# Patient Record
Sex: MALE | Race: OTHER
[De-identification: names, ages, dates, MRNs, and addresses within clinical notes are randomized per-mention and may not be internally consistent; named-entity substitution may affect disease eponyms.]

---

## 2018-07-05 NOTE — EMERGENCY ROOM REPORT
History of Present Illness


General


Chief Complaint:  Edema


Source:  Patient





Present Illness


HPI


** 67 YO Male presents to the ED C/O 3/ 10 in severity intermittent swelling of 

the right side of cheek/ mouth. pt. believes is due to tooth infection however 

denies tooth pain or tenderness. Pt. denies similar symptoms in the past. Pt 

states he doesn't know if he has had dry mouth. denies new medications. Denies 

fevers or chills. denies recent illness. 


Denies CP, Palpitations, LOC, AMS, dizziness, Changes in Vision, Sensation, 

paresthesias, or a sudden severe headache.


Allergies:  


Coded Allergies:  


     No Known Allergies (Unverified , 8/6/15)





Patient History


Past Medical History:  see triage record


Past Surgical History:  none


Pertinent Family History:  none


Immunizations:  UTD


Reviewed Nursing Documentation:  PMH: Agreed; PSxH: Agreed





Nursing Documentation-PMH


Past Medical History:  No History, Except For


Hx Neurological Problems:  Yes - Rheumatoid Arthritis, Fibromyalgia,





Review of Systems


All Other Systems:  negative except mentioned in HPI





Physical Exam





Vital Signs








  Date Time  Temp Pulse Resp B/P (MAP) Pulse Ox O2 Delivery O2 Flow Rate FiO2


 


7/5/18 13:05 97.5 68 16 126/76 94 Room Air  





 97.5       








Sp02 EP Interpretation:  reviewed, normal


General Appearance:  no apparent distress, alert, GCS 15, non-toxic


Head:  normocephalic, atraumatic


ENT:  hearing grossly normal, normal voice, other - mild swelling/fullness 

noted to the right lower cheek area, no palpable fluctuance, increased 

temperature to palpation or notable redness noted.


Neck:  full range of motion


Respiratory:  chest non-tender, lungs clear, normal breath sounds, speaking 

full sentences


Cardiovascular #1:  regular rate, rhythm


Musculoskeletal:  back normal, gait/station normal, normal range of motion, non-

tender


Neurologic:  alert, oriented x3, responsive, motor strength/tone normal, 

sensory intact, speech normal, grossly normal


Psychiatric:  judgement/insight normal


Skin:  normal color, no rash, warm/dry, well hydrated


Lymphatic:  no adenopathy





Medical Decision Making


PA Attestation


Dr. dunham is my supervising Physician whom patient management has been 

discussed with.


Diagnostic Impression:  


 Primary Impression:  


 Sialoadenitis, unspecified


 Additional Impression:  


 Parotid sialolithiasis


ER Course


Pt. presents to the ED c/o  facial swelling that is intermittent. Pt. denies 

tooth pain








Ddx considered but are not limited to sialoadenitis, sialolithiasis, mumps, 

abscess, neoplasm/mass, adenopathy. 





Vital signs: are WNL, pt. is afebrile


H&PE are most consistent with sialoadenitis, due to extension down into the 

neck further imaging is required. 





ORDERS: 








ED INTERVENTIONS: None required at this time. 





DISCHARGE: At this time pt. is stable for d/c to home. Will provide printed 

patient care instructions, and any necessary prescriptions. Care plan and 

follow up instructions have been discussed with the patient prior to discharge.





Last Vital Signs








  Date Time  Temp Pulse Resp B/P (MAP) Pulse Ox O2 Delivery O2 Flow Rate FiO2


 


7/5/18 13:05 97.5 68 16 126/76 94 Room Air  





 97.5       








Disposition:  HOME, SELF-CARE


Condition:  Stable


Scripts


Ibuprofen* (MOTRIN*) 600 Mg Tablet


600 MG ORAL Q6H PRN for For Pain, #20 TAB


   Prov: Alethea Cuevas         7/5/18 


Acetaminophen With Codeine (T#3) (TYLENOL #3 TAB*) Y Tab


1 TAB ORAL Q6HR PRN for For Pain, #9 TAB


   Prov: Alethea Cuevas         7/5/18 


Amoxicillin/Potassium Clav 875-125* (AUGMENTIN 875-125 TABLET*) 1 Each Tablet


1 TAB ORAL TWICE A DAY for 7 Days, #14 TAB


   Prov: Alethea Cuevas         7/5/18


Patient Instructions:  Salivary Gland Infection, Salivary Stone





Additional Instructions:  


Take medications as directed. 


 ** Follow up with a Primary Care Provider and your Dentist in 3-5 days, even 

if your symptoms have resolved. ** 


--Please review list of primary care clinics, if you do not already have a 

primary care provider





Return sooner to ED if new symptoms occur, or current symptoms become worse. 


Do not drink alcohol, drive, or operate heavy machinery while taking Tylenol #  

3 as this may cause drowsiness. 











- Please note that this Emergency Department Report was dictated using [employer] technology software, occasionally this can lead to 

erroneous entry secondary to interpretation by the dictation equipment.











Alethea Cuevas Jul 5, 2018 13:29

## 2018-12-06 ENCOUNTER — HOSPITAL ENCOUNTER (EMERGENCY)
Dept: HOSPITAL 72 - EMR | Age: 68
Discharge: HOME | End: 2018-12-06
Payer: MEDICARE

## 2018-12-06 VITALS — HEIGHT: 67 IN | WEIGHT: 191 LBS | BODY MASS INDEX: 29.98 KG/M2

## 2018-12-06 VITALS — DIASTOLIC BLOOD PRESSURE: 82 MMHG | SYSTOLIC BLOOD PRESSURE: 133 MMHG

## 2018-12-06 DIAGNOSIS — M06.9: ICD-10-CM

## 2018-12-06 DIAGNOSIS — M79.7: ICD-10-CM

## 2018-12-06 DIAGNOSIS — K08.89: Primary | ICD-10-CM

## 2018-12-06 PROCEDURE — 99282 EMERGENCY DEPT VISIT SF MDM: CPT

## 2018-12-06 NOTE — EMERGENCY ROOM REPORT
History of Present Illness


General


Chief Complaint:  Toothache


Source:  Patient, Medical Record





Present Illness


HPI


68-year-old male patient presents the ER complaining of gum and tooth infection 

for the past 2 weeks.  Patient reports history of similar symptoms in the past, 

states he was previously seen at this ER for similar symptoms associated with 

salivary gland infection.  Reports that he did not follow-up with maxillofacial 

surgeon after previous visit.  Reports symptoms began slow in onset during the 

past 2 weeks so he began using Listerine to try and prevent the infection from 

occurring.  Reports saw dentist a month and a half ago, states has not seen 

them since onset of symptoms.  Reports tooth pain on left upper gum.  Denies 

fever, vomiting, chest pain, shortness of breath.  Denies blood or pus drainage 

from gums.


Allergies:  


Coded Allergies:  


     No Known Allergies (Unverified , 12/6/18)





Patient History


Past Medical History:  see triage record


Reviewed Nursing Documentation:  PMH: Agreed; PSxH: Agreed





Nursing Documentation-PMH


Past Medical History:  No History, Except For


Hx Neurological Problems:  Yes - Rheumatoid Arthritis, Fibromyalgia,





Review of Systems


All Other Systems:  negative except mentioned in HPI





Physical Exam





Vital Signs








  Date Time  Temp Pulse Resp B/P (MAP) Pulse Ox O2 Delivery O2 Flow Rate FiO2


 


12/6/18 18:51 98.4 83 16 133/82 94 Room Air  








Sp02 EP Interpretation:  reviewed, normal


General Appearance:  well appearing, no apparent distress, alert, GCS 15, non-

toxic


Head:  normocephalic, atraumatic, other - No facial swelling


Eyes:  bilateral eye normal inspection, bilateral eye PERRL


ENT:  hearing grossly normal, normal pharynx, no angioedema, normal voice, 

uvula midline, moist mucus membranes, other - Left upper gum: Tooth #14 tender 

to palpation, gum inflamed, erythematous with small indurated palpable mass, no 

pus expressible, receding gums; multiple teeth missing


Neck:  full range of motion


Respiratory:  lungs clear, normal breath sounds, no rhonchi, no respiratory 

distress, no accessory muscle use, no wheezing, speaking full sentences


Cardiovascular #1:  regular rate, rhythm, no edema


Neurologic:  alert, oriented x3, responsive, motor strength/tone normal, 

sensory intact


Psychiatric:  mood/affect normal


Skin:  no rash





Medical Decision Making


PA Attestation


Dr. Arevalo  is my supervising Physician whom patient management has been 

discussed with.


Diagnostic Impression:  


 Primary Impression:  


 Toothache


 Additional Impression:  


 Gum inflammation


ER Course


Pt. presents to the ED c/o dental and gum pain.





Ddx considered but are not limited to cellulitis, abscess, dental caries, 

gingivitis, gum infection, preseptal cellulitis.





Does not require imaging at this time.





Vital signs: are WNL, pt. is afebrile





ED INTERVENTIONS: 


Pain medication provided in the ER.


Nontoxic appearing, speaking full sentences, no active draining, mild edema, no 

trismus or vision changes.


Erythematous and inflamed gum, indurated, no fluctuance, left upper gum tooth 

tender to palpation, likely dental infection, does not require I&D at this time.


Follow-up with maxillofacial specialist.  Contact information provided for 

specialist.


Will provide abx for infection to cover for possible infection. provided with 

contact information for dentists.


F/u with PCP and dentist for further treatment.





DISCHARGE:


-Rx provided for Tylenol


-Rx provided for Augmentin





At this time pt. is stable for d/c to home. Patient is resting comfortably, in 

no acute distress, nontoxic appearing, talking and smiling without difficulty.


 Will provide printed patient care instructions and any necessary 

prescriptions. 


Care plan and follow up instructions have been discussed with the patient prior 

to discharge. 


Patient instructed to follow-up with primary care provider in 2 - 3 days.


Followup with dentist.


Patient questions asked and answered.


Patient reports understanding and agreement to treatment plan.


ER precautions given. Patient instructed to return to ER immediately for any 

new or worsening of symptoms including but not limited to fever, worsening of 

pain symptoms, worsening of erythema, red streaking.





- Please note that this Emergency Department Report was dictated using [employer] technology software, occasionally this can lead to 

erroneous entry secondary to interpretation by the dictation equipment.





Last Vital Signs








  Date Time  Temp Pulse Resp B/P (MAP) Pulse Ox O2 Delivery O2 Flow Rate FiO2


 


12/6/18 18:51 98.4 83 16 133/82 94 Room Air  








Disposition:  HOME, SELF-CARE


Condition:  Stable


Scripts


Acetaminophen* (TYLENOL EXTRA STRENGTH*) 500 Mg Tablet


500 MG ORAL Q8H PRN for Prn Headache/Temp > 101, #30 TAB 0 Refills


   Prov: Christopher Sage         12/6/18 


Amoxicillin/Potassium Clav 875-125* (AUGMENTIN 875-125 TABLET*) 1 Each Tablet


1 TAB ORAL TWICE A DAY, #14 TAB


   Prov: Christopher Sage         12/6/18


Patient Instructions:  Dental Pain, Easy-to-Read, Gingivitis, Easy-to-Read





Additional Instructions:  


Follow-up with dentist in 1-2 days.


Followup with primary care provider in 1-2 days. Discuss referral to 

maxillofacial specialist/oral surgeon.


Take medications as directed. 


Patient questions asked and answered.


ER precautions given, patient instructed to return to ER immediately for any 

new or worsening of symptoms.











Christopher Sage Dec 6, 2018 19:30

## 2019-06-19 ENCOUNTER — HOSPITAL ENCOUNTER (EMERGENCY)
Dept: HOSPITAL 72 - EMR | Age: 69
Discharge: HOME | End: 2019-06-19
Payer: MEDICARE

## 2019-06-19 VITALS — SYSTOLIC BLOOD PRESSURE: 134 MMHG | DIASTOLIC BLOOD PRESSURE: 81 MMHG

## 2019-06-19 VITALS — BODY MASS INDEX: 28.88 KG/M2 | HEIGHT: 67 IN | WEIGHT: 184 LBS

## 2019-06-19 VITALS — SYSTOLIC BLOOD PRESSURE: 127 MMHG | DIASTOLIC BLOOD PRESSURE: 78 MMHG

## 2019-06-19 DIAGNOSIS — M06.9: ICD-10-CM

## 2019-06-19 DIAGNOSIS — T14.8XXA: ICD-10-CM

## 2019-06-19 DIAGNOSIS — E78.5: ICD-10-CM

## 2019-06-19 DIAGNOSIS — S33.5XXA: Primary | ICD-10-CM
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
## 2019-06-19 DIAGNOSIS — R10.2: ICD-10-CM

## 2019-06-19 DIAGNOSIS — M79.7: ICD-10-CM
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
## 2019-06-19 PROCEDURE — 99284 EMERGENCY DEPT VISIT MOD MDM: CPT

## 2019-06-19 PROCEDURE — 72131 CT LUMBAR SPINE W/O DYE: CPT

## 2019-06-19 PROCEDURE — 72192 CT PELVIS W/O DYE: CPT

## 2019-06-19 NOTE — DIAGNOSTIC IMAGING REPORT
Indication: Pelvic and right leg pain, status post trauma with trip and fall

 

Technique: Noncontrast spiral acquisitions obtained through the pelvis. Multiplanar

reconstructions generated. Total dose length product 410.17 mGycm. CTDIvol(s) 14.87

mGy.  Dose reduction achieved using automated exposure control

 

 

Comparison: none

 

Findings: No acute fractures. No dislocations. There are minimal degenerative changes

of the bilateral hip joints. There are degenerative changes of the lumbosacral

junction noted. There is mild increased density of the bilateral hip region

subcutaneous fat which is fairly symmetrical, but no definite significant soft tissue

contusion.

 

Included pelvic viscera are unremarkable.

 

Impression: No acute bony trauma

 

Degenerative changes of the lumbosacral junction.

 

 

 

The CT scanner at [facility] is accredited by the American College of

Radiology and the scans are performed using protocols designed to limit radiation

exposure to as low as reasonably achievable to attain images of sufficient resolution

adequate for diagnostic evaluation.

## 2019-06-19 NOTE — EMERGENCY ROOM REPORT
History of Present Illness


General


Chief Complaint:  Multiple Trauma/Fall


Source:  Patient





Present Illness


HPI


Patient presents with reports of trip and fall earlier this morning patient 

reports that [place] [employer] with his right leg 

slipping out from underneath him falling on his buttock area


Patient claims of pain to the pelvic area right lower back upper back area as 

well denies any lapse of consciousness


Denies any loss of control of bowel or urination denies any obvious focal 

weakness


Pain is 6 out of 10 worse with standing and ambulation


Allergies:  


Coded Allergies:  


     No Known Allergies (Unverified , 12/6/18)





Patient History


Past Medical History:  see triage record


Pertinent Family History:  none


Reviewed Nursing Documentation:  PMH: Agreed; PSxH: Agreed





Nursing Documentation-PMH


Hx Hypertension:  Yes - hyperlipidemia


Hx Neurological Problems:  Yes - Rheumatoid Arthritis, Fibromyalgia





Review of Systems


All Other Systems:  negative except mentioned in HPI





Physical Exam





Vital Signs








  Date Time  Temp Pulse Resp B/P (MAP) Pulse Ox O2 Delivery O2 Flow Rate FiO2


 


6/19/19 11:15 97.5 70 18 134/81 (98) 96 Room Air  








Sp02 EP Interpretation:  reviewed, normal


General Appearance:  well appearing, no apparent distress


Head:  normocephalic, atraumatic


Eyes:  bilateral eye PERRL, bilateral eye EOMI


ENT:  hearing grossly normal, normal pharynx, TMs + canals normal, uvula midline


Neck:  full range of motion, supple, no meningismus, no bony tend


Respiratory:  lungs clear, normal breath sounds, no rhonchi, no respiratory 

distress, no retraction, no accessory muscle use


Cardiovascular #1:  normal peripheral pulses, regular rate, rhythm, no edema, 

no gallop, no JVD, no murmur


Gastrointestinal:  normal bowel sounds, non tender, soft, no mass, no 

organomegaly, non-distended, no guarding, no hernia, no pulsatile mass, no 

rebound


Genitourinary:  no CVA tenderness


Musculoskeletal:  other - Tender on palpation of the right hip area also pain 

with flexion of the right hip tender on palpation of the right paraspinal L3-L4 

region


Neurologic:  oriented x3, responsive, CNs III-XII nml as tested, motor strength/

tone normal, sensory intact


Psychiatric:  mood/affect normal


Skin:  normal color, no rash, warm/dry, palpation normal


Lymphatic:  normal inspection, no adenopathy





Medical Decision Making


Diagnostic Impression:  


 Primary Impression:  


 Contusion


 Additional Impression:  


 Back sprain


ER Course


Given the patient's history and presentation multiple differentials considered 

including but not limited to contusion acute fracture


, Dislocations, patient's imaging studies are fairly benign, patient is 

ambulatory without any obvious focal deficit and is stable for initial 

conservative outpatient trial


CT/MRI/US Diagnostic Results


CT/MRI/US Diagnostic Results :  


   Impression


CT L-spineImpression: No acute bony trauma


 


Multilevel degenerative changes, as detailed on a level by level basis above








CT pelvic: no acute disease





Last Vital Signs








  Date Time  Temp Pulse Resp B/P (MAP) Pulse Ox O2 Delivery O2 Flow Rate FiO2


 


6/19/19 11:21 97.5 70 18 134/81 96 Room Air  








Status:  improved


Disposition:  HOME, SELF-CARE


Condition:  Improved


Scripts


Methocarbamol* (ROBAXIN-750*) 750 Mg Tablet


750 MG PO TID, #21 TAB 0 Refills


   Prov: Reji Davis DO         6/19/19 


Ibuprofen* (MOTRIN*) 600 Mg Tablet


600 MG ORAL Q8H PRN for For Pain, #20 TAB 0 Refills


   Prov: Reji Davis DO         6/19/19


Referrals:  


NON PHYSICIAN (PCP)





Additional Instructions:  


Patient is provided with the discharge instructions notified to follow up with 

primary doctor in the next 2-3 days otherwise return to the er with any 

worsening symptoms.


Please note that this report is being documented using [employer] technology.  This 

can lead to erroneous entry secondary to incorrect interpretation by the 

dictating instrument.











Reji Davis DO Jun 19, 2019 11:51

## 2019-06-19 NOTE — DIAGNOSTIC IMAGING REPORT
Indications: Trip and fall, lower back and upper back pain

 

Technique: Spiral acquisitions obtained through the lumbar spine. Multiplanar

reconstructions were generated. No IV contrast utilized. Total dose length product

520.97 mGycm. CTDIvol(s) 17.77 mGy.  Dose reduction achieved using automated exposure

control

 

 

Comparison: none  

 

Findings: There is very slight anterior offset of L5 on S1. Bony alignment is

otherwise normal. No acute fractures. No dislocations. Vertebral body heights are

preserved.

 

There is degenerative disc narrowing at T11-12, with considerable endplate

irregularity. There is mild narrowing of the right neural foramen. No significant

disc bulge or protrusion or spinal stenosis.

 

There is mild degenerative disc narrowing at T12-L1. No significant disc bulge or

protrusion, spinal stenosis, or neural foraminal stenosis.

 

At L1-2, no significant disc bulge or protrusion, spinal stenosis, or neural

foraminal stenosis. There is mild bilateral facet arthrosis.

 

At L2-3, there is circumferential annular bulge. This, in combination with facet and

ligament flavum hypertrophy, results in mild narrowing of the spinal canal. The

neural foramina are preserved. There is bilateral facet arthrosis. The disc space is

preserved

 

At L3-4, the disc space is preserved although there is mild vacuum formation. There

is circumferential annular bulge. This, in combination with facet and ligamentum

flavum hypertrophy results in mild narrowing of the spinal canal. The neural foramina

are preserved.

 

At L4-5, there is broad-based posterior disc protrusion. Both central and right

paracentral. This results in moderate narrowing of the spinal canal and the right

paracentral component a impinge upon the right lateral recess. There is mild neural

foraminal compromise from the bulging disc as well as facet hypertrophy from

bilateral facet arthrosis. There is severe degenerative disc narrowing with vacuum

formation.

 

At L5-S1, posterior osteophytes/disc bulge complex result in moderate narrowing of

the spinal canal. The neural foramina are preserved. There is severe degenerative

disc narrowing with vacuum formation and endplate irregularity. There is mild

bilateral facet arthrosis.

 

The included extra spinal soft tissues are unremarkable.

 

Impression: No acute bony trauma

 

Multilevel degenerative changes, as detailed on a level by level basis above

 

 

 

The CT scanner at [facility] is accredited by the American College of

Radiology and the scans are performed using protocols designed to limit radiation

exposure to as low as reasonably achievable to attain images of sufficient resolution

adequate for diagnostic evaluation.